# Patient Record
Sex: MALE | Race: OTHER | URBAN - METROPOLITAN AREA
[De-identification: names, ages, dates, MRNs, and addresses within clinical notes are randomized per-mention and may not be internally consistent; named-entity substitution may affect disease eponyms.]

---

## 2018-03-09 ENCOUNTER — OFFICE VISIT (OUTPATIENT)
Dept: INTERNAL MEDICINE CLINIC | Facility: CLINIC | Age: 23
End: 2018-03-09

## 2018-03-09 VITALS
WEIGHT: 237 LBS | RESPIRATION RATE: 18 BRPM | HEIGHT: 74 IN | DIASTOLIC BLOOD PRESSURE: 82 MMHG | OXYGEN SATURATION: 98 % | HEART RATE: 73 BPM | BODY MASS INDEX: 30.42 KG/M2 | SYSTOLIC BLOOD PRESSURE: 128 MMHG

## 2018-03-09 DIAGNOSIS — R55 VASOVAGAL EPISODE: ICD-10-CM

## 2018-03-09 DIAGNOSIS — L21.0 SEBORRHEA CAPITIS IN ADULT: Primary | ICD-10-CM

## 2018-03-09 PROCEDURE — 99213 OFFICE O/P EST LOW 20 MIN: CPT | Performed by: FAMILY MEDICINE

## 2018-03-09 RX ORDER — FLUOCINONIDE 0.5 MG/ML
1 SOLUTION TOPICAL DAILY
Qty: 120 ML | Refills: 5 | Status: SHIPPED | OUTPATIENT
Start: 2018-03-09

## 2018-03-09 RX ORDER — SELENIUM SULFIDE 22.5 MG/ML
1 SHAMPOO TOPICAL EVERY OTHER DAY
Qty: 180 ML | Refills: 5 | Status: SHIPPED | OUTPATIENT
Start: 2018-03-09

## 2018-03-09 NOTE — PROGRESS NOTES
CC:  Derm Problem (Pt presents to clinic for c/o dry skin that started on scalp 6 montha go and it now on forehead and eyebrows.)      Hx of CC:  6 MONTHS WITH GREASY SCALY PLAQUES ON HAIR LINE/SCALP NOT RESOLVED WITH HEAD AND SHOULDERS.     ALSO REPORTS AN

## 2021-12-01 ENCOUNTER — APPOINTMENT (OUTPATIENT)
Dept: URBAN - METROPOLITAN AREA CLINIC 321 | Age: 26
Setting detail: DERMATOLOGY
End: 2021-12-02

## 2021-12-01 DIAGNOSIS — R21 RASH AND OTHER NONSPECIFIC SKIN ERUPTION: ICD-10-CM

## 2021-12-01 PROCEDURE — 11104 PUNCH BX SKIN SINGLE LESION: CPT

## 2021-12-01 PROCEDURE — OTHER PRESCRIPTION: OTHER

## 2021-12-01 PROCEDURE — OTHER BIOPSY BY PUNCH METHOD: OTHER

## 2021-12-01 PROCEDURE — OTHER COUNSELING: OTHER

## 2021-12-01 RX ORDER — FLUOCINONIDE 0.5 MG/ML
SOLUTION TOPICAL
Qty: 60 | Refills: 0 | Status: ERX | COMMUNITY
Start: 2021-12-01

## 2021-12-01 RX ORDER — TRIAMCINOLONE ACETONIDE 1 MG/G
OINTMENT TOPICAL BID
Qty: 454 | Refills: 0 | Status: ERX | COMMUNITY
Start: 2021-12-01

## 2021-12-01 ASSESSMENT — LOCATION SIMPLE DESCRIPTION DERM
LOCATION SIMPLE: LEFT CALF
LOCATION SIMPLE: LEFT PRETIBIAL REGION
LOCATION SIMPLE: RIGHT PRETIBIAL REGION
LOCATION SIMPLE: RIGHT FOREARM
LOCATION SIMPLE: LEFT FOREHEAD
LOCATION SIMPLE: LEFT FOREARM
LOCATION SIMPLE: ABDOMEN
LOCATION SIMPLE: CHEST
LOCATION SIMPLE: RIGHT CHEEK

## 2021-12-01 ASSESSMENT — LOCATION DETAILED DESCRIPTION DERM
LOCATION DETAILED: PERIUMBILICAL SKIN
LOCATION DETAILED: LEFT PROXIMAL CALF
LOCATION DETAILED: LEFT DISTAL DORSAL FOREARM
LOCATION DETAILED: LEFT LATERAL ABDOMEN
LOCATION DETAILED: RIGHT LATERAL SUPERIOR CHEST
LOCATION DETAILED: RIGHT SUPERIOR LATERAL BUCCAL CHEEK
LOCATION DETAILED: LEFT PROXIMAL PRETIBIAL REGION
LOCATION DETAILED: LEFT LATERAL SUPERIOR CHEST
LOCATION DETAILED: RIGHT DISTAL DORSAL FOREARM
LOCATION DETAILED: LEFT MEDIAL FOREHEAD
LOCATION DETAILED: RIGHT PROXIMAL PRETIBIAL REGION
LOCATION DETAILED: LEFT VENTRAL DISTAL FOREARM
LOCATION DETAILED: RIGHT VENTRAL PROXIMAL FOREARM

## 2021-12-01 ASSESSMENT — LOCATION ZONE DERM
LOCATION ZONE: LEG
LOCATION ZONE: FACE
LOCATION ZONE: ARM
LOCATION ZONE: TRUNK

## 2021-12-01 NOTE — PROCEDURE: BIOPSY BY PUNCH METHOD
Consent: Written consent was obtained and risks were reviewed including but not limited to scarring, infection, bleeding, scabbing, incomplete removal, nerve damage and allergy to anesthesia.
Size Of Lesion In Cm (Optional): 0
Suture Removal: 14 days
Biopsy Type: H and E
Validate Note Data (See Information Below): Yes
Validate Triangulation: No
Anesthesia Volume In Cc (Will Not Render If 0): 0.5
Hemostasis: None
Punch Size In Mm: 4
Post-Care Instructions: I reviewed with the patient in detail post-care instructions. Patient is to keep the biopsy site dry overnight, and then apply vaseline twice daily until healed.
Detail Level: Detailed
Epidermal Sutures: 4-0 Nylon
Billing Type: Third-Party Bill
Anesthesia Type: 1% lidocaine with epinephrine
Dressing: bandage
Wound Care: Petrolatum
Information: Selecting Yes will display possible errors in your note based on the variables you have selected. This validation is only offered as a suggestion for you. PLEASE NOTE THAT THE VALIDATION TEXT WILL BE REMOVED WHEN YOU FINALIZE YOUR NOTE. IF YOU WANT TO FAX A PRELIMINARY NOTE YOU WILL NEED TO TOGGLE THIS TO 'NO' IF YOU DO NOT WANT IT IN YOUR FAXED NOTE.
Notification Instructions: Patient will be notified of biopsy results. However, patient instructed to call the office if not contacted within 2 weeks.

## 2021-12-15 ENCOUNTER — APPOINTMENT (OUTPATIENT)
Dept: URBAN - METROPOLITAN AREA CLINIC 321 | Age: 26
Setting detail: DERMATOLOGY
End: 2021-12-16

## 2021-12-15 DIAGNOSIS — L40.0 PSORIASIS VULGARIS: ICD-10-CM

## 2021-12-15 PROCEDURE — 99214 OFFICE O/P EST MOD 30 MIN: CPT

## 2021-12-15 PROCEDURE — OTHER COUNSELING: OTHER

## 2021-12-15 PROCEDURE — OTHER PRESCRIPTION MEDICATION MANAGEMENT: OTHER

## 2021-12-15 ASSESSMENT — LOCATION DETAILED DESCRIPTION DERM
LOCATION DETAILED: STERNUM
LOCATION DETAILED: RIGHT INFERIOR MEDIAL UPPER BACK
LOCATION DETAILED: PERIUMBILICAL SKIN
LOCATION DETAILED: LEFT ANTERIOR DISTAL UPPER ARM
LOCATION DETAILED: RIGHT ANTERIOR DISTAL UPPER ARM

## 2021-12-15 ASSESSMENT — LOCATION ZONE DERM
LOCATION ZONE: ARM
LOCATION ZONE: TRUNK

## 2021-12-15 ASSESSMENT — LOCATION SIMPLE DESCRIPTION DERM
LOCATION SIMPLE: RIGHT UPPER BACK
LOCATION SIMPLE: RIGHT UPPER ARM
LOCATION SIMPLE: ABDOMEN
LOCATION SIMPLE: LEFT UPPER ARM
LOCATION SIMPLE: CHEST

## 2021-12-15 ASSESSMENT — BSA PSORIASIS: % BODY COVERED IN PSORIASIS: 20

## 2021-12-15 NOTE — PROCEDURE: PRESCRIPTION MEDICATION MANAGEMENT
Detail Level: Zone
Render In Strict Bullet Format?: No
Continue Regimen: Triamcinolone topical and fluocinonide solution

## 2021-12-15 NOTE — PROCEDURE: COUNSELING
Patient Specific Counseling (Will Not Stick From Patient To Patient): \\nDiscussed in detail various treatment options including topicals vs Otezla vs biologics. Pt opts to start with topicals. Pt given handouts on Otezla and Illumya
Detail Level: Generalized

## 2022-01-24 ENCOUNTER — APPOINTMENT (OUTPATIENT)
Dept: URBAN - METROPOLITAN AREA CLINIC 321 | Age: 27
Setting detail: DERMATOLOGY
End: 2022-01-24

## 2022-01-24 DIAGNOSIS — L40.0 PSORIASIS VULGARIS: ICD-10-CM

## 2022-01-24 DIAGNOSIS — Z79.899 OTHER LONG TERM (CURRENT) DRUG THERAPY: ICD-10-CM

## 2022-01-24 PROCEDURE — OTHER ORDER TESTS: OTHER

## 2022-01-24 PROCEDURE — OTHER COUNSELING: OTHER

## 2022-01-24 PROCEDURE — OTHER PRESCRIPTION MEDICATION MANAGEMENT: OTHER

## 2022-01-24 PROCEDURE — 99214 OFFICE O/P EST MOD 30 MIN: CPT

## 2022-01-24 ASSESSMENT — LOCATION DETAILED DESCRIPTION DERM
LOCATION DETAILED: RIGHT ANTERIOR DISTAL UPPER ARM
LOCATION DETAILED: PERIUMBILICAL SKIN
LOCATION DETAILED: LEFT ANTERIOR DISTAL UPPER ARM
LOCATION DETAILED: RIGHT INFERIOR MEDIAL UPPER BACK
LOCATION DETAILED: STERNUM

## 2022-01-24 ASSESSMENT — LOCATION SIMPLE DESCRIPTION DERM
LOCATION SIMPLE: RIGHT UPPER BACK
LOCATION SIMPLE: LEFT UPPER ARM
LOCATION SIMPLE: ABDOMEN
LOCATION SIMPLE: RIGHT UPPER ARM
LOCATION SIMPLE: CHEST

## 2022-01-24 ASSESSMENT — LOCATION ZONE DERM
LOCATION ZONE: TRUNK
LOCATION ZONE: ARM

## 2022-01-24 NOTE — PROCEDURE: PRESCRIPTION MEDICATION MANAGEMENT
Render In Strict Bullet Format?: No
Continue Regimen: Triamcinolone topical and fluocinonide solution
Detail Level: Zone

## 2022-03-24 ENCOUNTER — APPOINTMENT (OUTPATIENT)
Dept: URBAN - METROPOLITAN AREA CLINIC 244 | Age: 27
Setting detail: DERMATOLOGY
End: 2022-03-26

## 2022-03-24 ENCOUNTER — RX ONLY (RX ONLY)
Age: 27
End: 2022-03-24

## 2022-03-24 DIAGNOSIS — Z79.899 OTHER LONG TERM (CURRENT) DRUG THERAPY: ICD-10-CM

## 2022-03-24 DIAGNOSIS — L40.0 PSORIASIS VULGARIS: ICD-10-CM

## 2022-03-24 PROCEDURE — OTHER COUNSELING: OTHER

## 2022-03-24 PROCEDURE — OTHER ORDER TESTS: OTHER

## 2022-03-24 PROCEDURE — 99214 OFFICE O/P EST MOD 30 MIN: CPT

## 2022-03-24 PROCEDURE — OTHER ADDITIONAL NOTES: OTHER

## 2022-03-24 PROCEDURE — OTHER PRESCRIPTION MEDICATION MANAGEMENT: OTHER

## 2022-03-24 RX ORDER — TRIAMCINOLONE ACETONIDE 1 MG/G
OINTMENT TOPICAL BID
Qty: 454 | Refills: 2 | Status: ERX

## 2022-03-24 ASSESSMENT — LOCATION DETAILED DESCRIPTION DERM
LOCATION DETAILED: LEFT ANTERIOR DISTAL UPPER ARM
LOCATION DETAILED: STERNUM
LOCATION DETAILED: PERIUMBILICAL SKIN
LOCATION DETAILED: RIGHT INFERIOR MEDIAL UPPER BACK
LOCATION DETAILED: RIGHT ANTERIOR DISTAL UPPER ARM

## 2022-03-24 ASSESSMENT — LOCATION ZONE DERM
LOCATION ZONE: ARM
LOCATION ZONE: TRUNK

## 2022-03-24 ASSESSMENT — LOCATION SIMPLE DESCRIPTION DERM
LOCATION SIMPLE: CHEST
LOCATION SIMPLE: LEFT UPPER ARM
LOCATION SIMPLE: RIGHT UPPER BACK
LOCATION SIMPLE: ABDOMEN
LOCATION SIMPLE: RIGHT UPPER ARM

## 2022-03-24 NOTE — PROCEDURE: ADDITIONAL NOTES
Render Risk Assessment In Note?: no
Detail Level: Simple
Additional Notes: Lab order was provided PA will initiate when pt. returns with results.

## 2022-03-24 NOTE — PROCEDURE: PRESCRIPTION MEDICATION MANAGEMENT
Continue Regimen: Triamcinolone topical Ointment- Body PRN
Plan: Will RTC to initiate the NBUVB. Pre D form was provided to pt, he will verify w/ins if NBUVB is covered
Detail Level: Zone
Render In Strict Bullet Format?: No
Discontinue Regimen: fluocinonide solution

## 2023-01-10 ENCOUNTER — OFFICE VISIT (OUTPATIENT)
Dept: FAMILY MEDICINE CLINIC | Facility: CLINIC | Age: 28
End: 2023-01-10
Payer: COMMERCIAL

## 2023-01-10 VITALS
SYSTOLIC BLOOD PRESSURE: 138 MMHG | HEIGHT: 74 IN | BODY MASS INDEX: 38.35 KG/M2 | WEIGHT: 298.81 LBS | RESPIRATION RATE: 18 BRPM | TEMPERATURE: 97 F | OXYGEN SATURATION: 97 % | DIASTOLIC BLOOD PRESSURE: 74 MMHG | HEART RATE: 82 BPM

## 2023-01-10 DIAGNOSIS — J34.89 NASAL DRYNESS: Primary | ICD-10-CM

## 2023-01-10 DIAGNOSIS — R68.2 DRY MOUTH: ICD-10-CM

## 2023-01-10 DIAGNOSIS — R09.81 NASAL CONGESTION: ICD-10-CM

## 2023-01-10 PROCEDURE — 3078F DIAST BP <80 MM HG: CPT | Performed by: NURSE PRACTITIONER

## 2023-01-10 PROCEDURE — 3008F BODY MASS INDEX DOCD: CPT | Performed by: NURSE PRACTITIONER

## 2023-01-10 PROCEDURE — 99202 OFFICE O/P NEW SF 15 MIN: CPT | Performed by: NURSE PRACTITIONER

## 2023-01-10 PROCEDURE — 3075F SYST BP GE 130 - 139MM HG: CPT | Performed by: NURSE PRACTITIONER

## 2023-01-10 NOTE — PATIENT INSTRUCTIONS
- For dryness, you should push fluids, try over the counter nasal saline spray as needed, can apply vaseline or aquaphor ointment right within base of your nostril, use a humidifier or sit near steam/steam showers frequently. - Follow up if persistent congestion/sinus symptoms at the point of 10 days. - Call sooner if new symptoms.  - Avoid alcohol or drug use. - Go to the ER if any symptoms of dehydration such as decreased urination, not able to take fluids, dizziness.

## 2023-02-28 ENCOUNTER — APPOINTMENT (OUTPATIENT)
Dept: URBAN - METROPOLITAN AREA CLINIC 244 | Age: 28
Setting detail: DERMATOLOGY
End: 2023-03-02

## 2023-02-28 DIAGNOSIS — Z79.899 OTHER LONG TERM (CURRENT) DRUG THERAPY: ICD-10-CM

## 2023-02-28 DIAGNOSIS — L40.0 PSORIASIS VULGARIS: ICD-10-CM

## 2023-02-28 PROCEDURE — OTHER PRESCRIPTION: OTHER

## 2023-02-28 PROCEDURE — OTHER MEDICATION COUNSELING: OTHER

## 2023-02-28 PROCEDURE — 99214 OFFICE O/P EST MOD 30 MIN: CPT

## 2023-02-28 PROCEDURE — OTHER ORDER TESTS: OTHER

## 2023-02-28 PROCEDURE — OTHER COUNSELING: OTHER

## 2023-02-28 RX ORDER — TACROLIMUS 1 MG/G
OINTMENT TOPICAL
Qty: 60 | Refills: 3 | Status: ACTIVE

## 2023-02-28 RX ORDER — TRIAMCINOLONE ACETONIDE 1 MG/G
OINTMENT TOPICAL BID
Qty: 454 | Refills: 0 | Status: ERX

## 2023-02-28 ASSESSMENT — LOCATION SIMPLE DESCRIPTION DERM
LOCATION SIMPLE: RIGHT FOREARM
LOCATION SIMPLE: RIGHT PRETIBIAL REGION
LOCATION SIMPLE: LEFT FOREARM
LOCATION SIMPLE: RIGHT CHEEK
LOCATION SIMPLE: CHEST
LOCATION SIMPLE: LEFT PRETIBIAL REGION
LOCATION SIMPLE: LEFT CALF
LOCATION SIMPLE: LEFT FOREHEAD
LOCATION SIMPLE: ABDOMEN

## 2023-02-28 ASSESSMENT — LOCATION ZONE DERM
LOCATION ZONE: FACE
LOCATION ZONE: LEG
LOCATION ZONE: TRUNK
LOCATION ZONE: ARM

## 2023-02-28 ASSESSMENT — LOCATION DETAILED DESCRIPTION DERM
LOCATION DETAILED: LEFT PROXIMAL PRETIBIAL REGION
LOCATION DETAILED: RIGHT SUPERIOR LATERAL BUCCAL CHEEK
LOCATION DETAILED: LEFT VENTRAL DISTAL FOREARM
LOCATION DETAILED: PERIUMBILICAL SKIN
LOCATION DETAILED: LEFT MEDIAL FOREHEAD
LOCATION DETAILED: RIGHT LATERAL SUPERIOR CHEST
LOCATION DETAILED: LEFT PROXIMAL CALF
LOCATION DETAILED: LEFT LATERAL SUPERIOR CHEST
LOCATION DETAILED: LEFT DISTAL DORSAL FOREARM
LOCATION DETAILED: RIGHT PROXIMAL PRETIBIAL REGION
LOCATION DETAILED: RIGHT DISTAL DORSAL FOREARM
LOCATION DETAILED: RIGHT VENTRAL PROXIMAL FOREARM

## 2023-02-28 NOTE — PROCEDURE: MEDICATION COUNSELING
Consent: The patient's consent was obtained including but not limited to risks of crusting, scabbing, blistering, scarring, darker or lighter pigmentary change, recurrence, incomplete removal and infection. Detail Level: Detailed Include Z78.9 (Other Specified Conditions Influencing Health Status) As An Associated Diagnosis?: No Treatment Number (Will Not Render If 0): 0 Medical Necessity Information: It is in your best interest to select a reason for this procedure from the list below. All of these items fulfill various CMS LCD requirements except the new and changing color options. Post-Care Instructions: I reviewed with the patient in detail post-care instructions. Patient is to wear sunprotection, and avoid picking at any of the treated lesions. Pt may apply Vaseline to crusted or scabbing areas. Medical Necessity Clause: This procedure was medically necessary because the lesions that were treated were: spreading Anesthesia Volume In Cc: 2 Benzoyl Peroxide Pregnancy And Lactation Text: This medication is Pregnancy Category C. It is unknown if benzoyl peroxide is excreted in breast milk.

## 2023-02-28 NOTE — PROCEDURE: MEDICATION COUNSELING
Xelrajsehz Pregnancy And Lactation Text: This medication is Pregnancy Category D and is not considered safe during pregnancy.  The risk during breast feeding is also uncertain. Xelrajeshz Pregnancy And Lactation Text: This medication is Pregnancy Category D and is not considered safe during pregnancy.  The risk during breast feeding is also uncertain.

## 2023-07-13 NOTE — PROCEDURE: MEDICATION COUNSELING
show Vtama Pregnancy And Lactation Text: It is unknown if this medication can cause problems during pregnancy and breastfeeding.

## 2024-02-03 ENCOUNTER — OFFICE VISIT (OUTPATIENT)
Dept: FAMILY MEDICINE CLINIC | Facility: CLINIC | Age: 29
End: 2024-02-03
Payer: COMMERCIAL

## 2024-02-03 VITALS
DIASTOLIC BLOOD PRESSURE: 84 MMHG | HEART RATE: 78 BPM | OXYGEN SATURATION: 97 % | WEIGHT: 275 LBS | RESPIRATION RATE: 20 BRPM | TEMPERATURE: 98 F | HEIGHT: 74 IN | BODY MASS INDEX: 35.29 KG/M2 | SYSTOLIC BLOOD PRESSURE: 136 MMHG

## 2024-02-03 DIAGNOSIS — R06.2 WHEEZING: ICD-10-CM

## 2024-02-03 DIAGNOSIS — J22 LOWER RESPIRATORY TRACT INFECTION: Primary | ICD-10-CM

## 2024-02-03 PROCEDURE — 99213 OFFICE O/P EST LOW 20 MIN: CPT

## 2024-02-03 PROCEDURE — 3075F SYST BP GE 130 - 139MM HG: CPT

## 2024-02-03 PROCEDURE — 3079F DIAST BP 80-89 MM HG: CPT

## 2024-02-03 PROCEDURE — 3008F BODY MASS INDEX DOCD: CPT

## 2024-02-03 RX ORDER — AMOXICILLIN AND CLAVULANATE POTASSIUM 875; 125 MG/1; MG/1
1 TABLET, FILM COATED ORAL 2 TIMES DAILY
Qty: 14 TABLET | Refills: 0 | Status: SHIPPED | OUTPATIENT
Start: 2024-02-03 | End: 2024-02-10

## 2024-02-03 RX ORDER — ALBUTEROL SULFATE 90 UG/1
2 AEROSOL, METERED RESPIRATORY (INHALATION) EVERY 4 HOURS PRN
Qty: 1 EACH | Refills: 0 | Status: SHIPPED | OUTPATIENT
Start: 2024-02-03

## 2024-02-03 NOTE — PROGRESS NOTES
CHIEF COMPLAINT:     Chief Complaint   Patient presents with    Ear Problem       HPI:   Manny Vernon is a 28 year old male who presents for upper respiratory symptoms for  2 weeks. Symptoms have been mild since onset. Patient was seen on 1/20/2024 for an otitis media and treated with 10 days of Amoxicillin 875 mg. Patient reports continued cough with raspy voice and ear pressure despite treatment.     Current Outpatient Medications   Medication Sig Dispense Refill    albuterol (PROAIR HFA) 108 (90 Base) MCG/ACT Inhalation Aero Soln Inhale 2 puffs into the lungs every 4 (four) hours as needed for Wheezing. 1 each 0    amoxicillin clavulanate 875-125 MG Oral Tab Take 1 tablet by mouth 2 (two) times daily for 7 days. 14 tablet 0    Fluocinonide 0.05 % External Solution Apply 1 Application topically daily. 120 mL 5    Selenium Sulf-Pyrithione-Urea (SELENIUM SULFIDE) 2.25 % External Shampoo Apply 1 Application topically every other day. (Patient not taking: Reported on 2/3/2024) 180 mL 5      History reviewed. No pertinent past medical history.   Past Surgical History:   Procedure Laterality Date    OTHER SURGICAL HISTORY      testicle surgery-          Social History     Socioeconomic History    Marital status: Single   Tobacco Use    Smoking status: Never    Smokeless tobacco: Never   Substance and Sexual Activity    Alcohol use: No     Alcohol/week: 0.0 standard drinks of alcohol    Drug use: Yes     Types: Cannabis     Comment: occ use         REVIEW OF SYSTEMS:   GENERAL: Normal appetite  SKIN: no rashes or abnormal skin lesions  HEENT: See HPI  LUNGS: See HPI  CARDIOVASCULAR: denies chest pain or palpitations   GI: denies N/V/C or abdominal pain      EXAM:   /84   Pulse 78   Temp 97.6 °F (36.4 °C)   Resp 20   Ht 6' 2\" (1.88 m)   Wt 275 lb (124.7 kg)   SpO2 97%   BMI 35.31 kg/m²   Physical Exam  Vitals reviewed.   Constitutional:       General: He is not in acute distress.     Appearance:  Normal appearance. He is not ill-appearing or toxic-appearing.   HENT:      Head: Normocephalic and atraumatic.      Right Ear: Ear canal and external ear normal. A middle ear effusion is present.      Left Ear: Ear canal and external ear normal. A middle ear effusion is present.      Nose: Nose normal.      Mouth/Throat:      Mouth: Mucous membranes are moist.      Pharynx: Oropharynx is clear. Uvula midline. Posterior oropharyngeal erythema present. No oropharyngeal exudate or uvula swelling.      Tonsils: No tonsillar exudate or tonsillar abscesses. 1+ on the right. 1+ on the left.   Eyes:      Extraocular Movements: Extraocular movements intact.      Conjunctiva/sclera: Conjunctivae normal.      Pupils: Pupils are equal, round, and reactive to light.   Cardiovascular:      Rate and Rhythm: Normal rate and regular rhythm.      Pulses: Normal pulses.      Heart sounds: Normal heart sounds.   Pulmonary:      Effort: Pulmonary effort is normal. No respiratory distress.      Breath sounds: No stridor. Wheezing and rhonchi present.      Comments: +cough    Musculoskeletal:         General: Normal range of motion.      Cervical back: Normal range of motion and neck supple. No rigidity.   Lymphadenopathy:      Cervical: No cervical adenopathy.   Skin:     General: Skin is warm and dry.      Capillary Refill: Capillary refill takes less than 2 seconds.      Findings: No rash.   Neurological:      General: No focal deficit present.      Mental Status: He is alert and oriented to person, place, and time.   Psychiatric:         Mood and Affect: Mood normal.         Behavior: Behavior normal.            ASSESSMENT AND PLAN:   Manny Vernon is a 28 year old male who presents with upper respiratory symptoms that are consistent with    ASSESSMENT:   Encounter Diagnoses   Name Primary?    Wheezing     Lower respiratory tract infection Yes       PLAN: Education provided.  Questions answered.  Reassurance given. Discussed  with patient that the physical exam and symptoms are most likey due to lower respiratory infection and will treat with antibiotics. Will prescribe inhaler for wheezing. Medications as listed below. Risks, benefits, side effects of medication addressed and explained. Advised patient to continue supportive care: maintain hydration and symptom management with OTC medications. Comfort Care as listed in Patient Instructions. Recommend close follow up with PCP. If sx's persist or worsen patient is advised to go to ER for immediate evaluation and treatment. The patient indicates understanding of these issues and agrees to the plan.    Meds & Refills for this Visit:  Requested Prescriptions     Signed Prescriptions Disp Refills    albuterol (PROAIR HFA) 108 (90 Base) MCG/ACT Inhalation Aero Soln 1 each 0     Sig: Inhale 2 puffs into the lungs every 4 (four) hours as needed for Wheezing.    amoxicillin clavulanate 875-125 MG Oral Tab 14 tablet 0     Sig: Take 1 tablet by mouth 2 (two) times daily for 7 days.

## 2024-02-16 ENCOUNTER — OFFICE VISIT (OUTPATIENT)
Dept: FAMILY MEDICINE CLINIC | Facility: CLINIC | Age: 29
End: 2024-02-16
Payer: COMMERCIAL

## 2024-02-16 VITALS
TEMPERATURE: 99 F | WEIGHT: 279 LBS | BODY MASS INDEX: 35.81 KG/M2 | OXYGEN SATURATION: 97 % | HEART RATE: 70 BPM | HEIGHT: 74 IN | DIASTOLIC BLOOD PRESSURE: 74 MMHG | RESPIRATION RATE: 16 BRPM | SYSTOLIC BLOOD PRESSURE: 138 MMHG

## 2024-02-16 DIAGNOSIS — R05.2 SUBACUTE COUGH: Primary | ICD-10-CM

## 2024-02-16 DIAGNOSIS — J34.89 SINUS DRAINAGE: ICD-10-CM

## 2024-02-16 PROCEDURE — 3075F SYST BP GE 130 - 139MM HG: CPT | Performed by: NURSE PRACTITIONER

## 2024-02-16 PROCEDURE — 3008F BODY MASS INDEX DOCD: CPT | Performed by: NURSE PRACTITIONER

## 2024-02-16 PROCEDURE — 99213 OFFICE O/P EST LOW 20 MIN: CPT | Performed by: NURSE PRACTITIONER

## 2024-02-16 PROCEDURE — 3078F DIAST BP <80 MM HG: CPT | Performed by: NURSE PRACTITIONER

## 2024-02-16 NOTE — PROGRESS NOTES
Subjective:   Patient ID: Manny Vernon is a 29 year old male.    Pt presents to [facility] c/o sinus/cough symptoms for 3 weeks.  C/o productive cough, nasal congestion, intermittent sore throat, intermittent left ear pain.  Feels like its sometimes hard to breathe in due to congestion and mucous in throat but otherwise no breathing difficulty.  Denies fever, body aches, chills, wheezing, chest tightness, dyspnea, SOB, chest pain, GI complaints, or rashes.  Was seen in [facility] 2/3/24 and given course of augmentin for lower respiratory infection, just prior to that he took course of amox for ear/sinus symptoms.  He feels the Augmentin improved symptoms but some of it's persisting.  Took one dayquil today.  Smokes cannabis regularly.  No known ill contacts or travel.        History/Other:   Review of Systems   Constitutional:  Negative for fatigue and fever.   HENT:  Positive for congestion, ear pain, postnasal drip, sinus pressure and sore throat. Negative for sinus pain.    Respiratory:  Positive for cough. Negative for chest tightness, shortness of breath and wheezing.    Cardiovascular:  Negative for chest pain.   Musculoskeletal:  Negative for myalgias.     Current Outpatient Medications   Medication Sig Dispense Refill    albuterol (PROAIR HFA) 108 (90 Base) MCG/ACT Inhalation Aero Soln Inhale 2 puffs into the lungs every 4 (four) hours as needed for Wheezing. 1 each 0    Fluocinonide 0.05 % External Solution Apply 1 Application topically daily. 120 mL 5     Allergies:No Known Allergies    Objective:   /74   Pulse 70   Temp 98.7 °F (37.1 °C)   Resp 16   Ht 6' 2\" (1.88 m)   Wt 279 lb (126.6 kg)   SpO2 97%   BMI 35.82 kg/m²     Physical Exam  Vitals reviewed.   Constitutional:       Appearance: Normal appearance.   HENT:      Head: Normocephalic and atraumatic.      Right Ear: Tympanic membrane, ear canal and external ear normal.      Left Ear: Tympanic membrane, ear canal and external ear normal.       Nose: Congestion present. No rhinorrhea.      Mouth/Throat:      Mouth: Mucous membranes are moist.      Pharynx: Posterior oropharyngeal erythema (with post nasal drainage) present. No oropharyngeal exudate.   Eyes:      Extraocular Movements: Extraocular movements intact.      Conjunctiva/sclera: Conjunctivae normal.      Pupils: Pupils are equal, round, and reactive to light.   Cardiovascular:      Rate and Rhythm: Normal rate and regular rhythm.      Pulses: Normal pulses.      Heart sounds: Normal heart sounds.   Pulmonary:      Effort: Pulmonary effort is normal. No respiratory distress.      Breath sounds: Normal breath sounds. No wheezing or rhonchi.      Comments: +productive cough- occasional  Musculoskeletal:      Cervical back: Normal range of motion and neck supple. No tenderness.   Skin:     General: Skin is warm and dry.      Findings: No rash.   Neurological:      Mental Status: He is alert.         Assessment & Plan:   1. Subacute cough    2. Sinus drainage        No orders of the defined types were placed in this encounter.      Meds This Visit:  Requested Prescriptions      No prescriptions requested or ordered in this encounter       - Just completed amox then augmentin.  No strong sign of bacterial infection at this time.  - Lungs clear, vitals/o2 sat stable, no dyspnea.  - Suggested Flonase, Mucinex.  - Humidifier, fluids, elevate HOB at night.    - Encourage smoking cessation.  - Follow up if no improvement/worsening within 1-2 weeks; sooner if new fever or worsening breathing.  - To ER if ever dyspnea, chest pain, SOB.  - Pt verbalizes understanding.

## 2024-02-26 ENCOUNTER — HOSPITAL ENCOUNTER (OUTPATIENT)
Age: 29
Discharge: HOME | End: 2024-02-26
Payer: COMMERCIAL

## 2024-02-26 VITALS
OXYGEN SATURATION: 98 % | TEMPERATURE: 98 F | DIASTOLIC BLOOD PRESSURE: 83 MMHG | SYSTOLIC BLOOD PRESSURE: 113 MMHG | RESPIRATION RATE: 16 BRPM | HEART RATE: 79 BPM

## 2024-02-26 DIAGNOSIS — J06.9 VIRAL UPPER RESPIRATORY TRACT INFECTION: Primary | ICD-10-CM

## 2024-02-26 LAB — S PYO AG THROAT QL: NEGATIVE

## 2024-02-26 PROCEDURE — 87880 STREP A ASSAY W/OPTIC: CPT

## 2024-02-26 PROCEDURE — 99213 OFFICE O/P EST LOW 20 MIN: CPT

## 2024-02-26 RX ORDER — BENZONATATE 100 MG/1
100 CAPSULE ORAL 3 TIMES DAILY PRN
Qty: 30 CAPSULE | Refills: 0 | Status: SHIPPED | OUTPATIENT
Start: 2024-02-26 | End: 2024-03-27

## 2024-02-26 NOTE — ED INITIAL ASSESSMENT (HPI)
Pt states with sore throat, coughing up phlegm, L ear popping x 1 month. Pt tx for ear infection, given amox ~ 1 month ago.  Seen again in a clinic and given amox-clauv which he finished over two weeks ago. Pt still with symptoms. Denies fever and chills

## 2024-02-26 NOTE — DISCHARGE INSTRUCTIONS
Strep test is negative.  There are no signs of infection on physical exam.  This is likely a viral illness.  I sent a prescription for Tessalon Perles for the cough.  Please be sure to drink plenty of fluids, use Tylenol and Motrin for pain or fever.  Use Flonase and Mucinex for congestion.  If you develop any respiratory complaints, fever that does not improve with medications or any other concerning complaints you should go to the emergency department. Otherwise follow up with your primary care provider.

## 2024-02-26 NOTE — ED PROVIDER NOTES
Patient Seen in: [facility]      History     Chief Complaint   Patient presents with    Cough/URI     Stated Complaint: ear issue    Subjective:   Manny is a 29-year-old male presenting to the immediate care complaining of cough, congestion, rhinorrhea and sore throat for the past month.  Patient states that he was seen at the beginning of the month in the walk-in clinic and put on an antibiotic for an ear infection.  Patient was seen 2 weeks later for continued congestion and ear pain and treated with Augmentin.  Patient states that he has had increased sore throat over the past couple of days so he came in for evaluation.  Patient also states that he has continued postnasal drip and rhinorrhea.  Has not tried any over-the-counter cold medication.  He denies any fever, chest pain, shortness of breath, abdominal pain or vomiting.  He has been eating and drinking well and is well-hydrated.  He denies any other concerns or complaints.          Objective:   No pertinent past medical history.            No pertinent past surgical history.              No pertinent social history.            Review of Systems   HENT:  Positive for congestion, postnasal drip, rhinorrhea and sore throat.    Respiratory:  Positive for cough.    All other systems reviewed and are negative.      Positive for stated complaint: ear issue  Other systems are as noted in HPI.  Constitutional and vital signs reviewed.      All other systems reviewed and negative except as noted above.    Physical Exam     ED Triage Vitals [02/26/24 1019]   /83   Pulse 79   Resp 16   Temp 97.8 °F (36.6 °C)   Temp src Temporal   SpO2 98 %   O2 Device None (Room air)       Current:/83   Pulse 79   Temp 97.8 °F (36.6 °C) (Temporal)   Resp 16   SpO2 98%         Physical Exam  Vitals and nursing note reviewed.   Constitutional:       General: He is not in acute distress.     Appearance: Normal appearance. He is not ill-appearing,  toxic-appearing or diaphoretic.   HENT:      Head: Normocephalic.      Right Ear: Tympanic membrane, ear canal and external ear normal.      Left Ear: Tympanic membrane, ear canal and external ear normal.      Nose: Congestion and rhinorrhea present.      Mouth/Throat:      Mouth: Mucous membranes are moist.      Pharynx: Oropharynx is clear.   Eyes:      Conjunctiva/sclera: Conjunctivae normal.   Cardiovascular:      Rate and Rhythm: Normal rate and regular rhythm.      Pulses: Normal pulses.      Heart sounds: Normal heart sounds.   Pulmonary:      Effort: Pulmonary effort is normal. No respiratory distress.      Breath sounds: Normal breath sounds. No stridor. No wheezing, rhonchi or rales.   Abdominal:      General: Abdomen is flat.   Musculoskeletal:         General: Normal range of motion.      Cervical back: Normal range of motion.   Skin:     General: Skin is warm.      Capillary Refill: Capillary refill takes less than 2 seconds.   Neurological:      General: No focal deficit present.      Mental Status: He is alert and oriented to person, place, and time.   Psychiatric:         Mood and Affect: Mood normal.         Behavior: Behavior normal.         Thought Content: Thought content normal.         Judgment: Judgment normal.              ED Course     Labs Reviewed   POCT RAPID STREP - Normal         MDM             Medical Decision Making  Multiple medical diagnoses were considered including but not limited to viral versus bacterial etiology of upper respiratory complaints.  Patient is well appearing, non-toxic and in no acute distress.  Vital signs are stable.   Strep test is negative.  There are no signs of infection on physical exam.  History and physical exam are consistent with viral illness.  Sent a prescription for Tessalon Perles for the cough.  Recommended that patient drink plenty of fluids, use Tylenol and Motrin for pain or fever, use Flonase for nasal congestion and may use over-the-counter  medications for congestion as needed.  Recommended that if the patient develops any chest pain, respiratory complaints, fever that does not improve with medications or any other concerning complaints they should go to the emergency department.    ED precautions discussed.  Patient advised to follow up with PCP in 2-3 days.  Patient agrees with this plan of care.  Patient verbalizes understanding of discharge instructions and plan of care.      Amount and/or Complexity of Data Reviewed  Labs: ordered. Decision-making details documented in ED Course.    Risk  OTC drugs.  Prescription drug management.        Disposition and Plan     Clinical Impression:  1. Viral upper respiratory tract infection         Disposition:  Discharge  2/26/2024 11:35 am    Follow-up:  Hunter Reddy,                 Medications Prescribed:  Discharge Medication List as of 2/26/2024 11:38 AM        START taking these medications    Details   benzonatate 100 MG Oral Cap Take 1 capsule (100 mg total) by mouth 3 (three) times daily as needed for cough., Print, Disp-30 capsule, R-0

## 2024-02-28 ENCOUNTER — APPOINTMENT (OUTPATIENT)
Dept: GENERAL RADIOLOGY | Age: 29
End: 2024-02-28
Attending: NURSE PRACTITIONER
Payer: COMMERCIAL

## 2024-02-28 ENCOUNTER — HOSPITAL ENCOUNTER (OUTPATIENT)
Age: 29
Discharge: HOME | End: 2024-02-28
Payer: COMMERCIAL

## 2024-02-28 VITALS
RESPIRATION RATE: 18 BRPM | HEART RATE: 86 BPM | TEMPERATURE: 97 F | DIASTOLIC BLOOD PRESSURE: 90 MMHG | OXYGEN SATURATION: 96 % | SYSTOLIC BLOOD PRESSURE: 135 MMHG

## 2024-02-28 DIAGNOSIS — K52.1 ANTIBIOTIC-ASSOCIATED DIARRHEA: ICD-10-CM

## 2024-02-28 DIAGNOSIS — T36.95XA ANTIBIOTIC-ASSOCIATED DIARRHEA: ICD-10-CM

## 2024-02-28 DIAGNOSIS — R05.2 SUBACUTE COUGH: Primary | ICD-10-CM

## 2024-02-28 LAB
#MXD IC: 1.1 X10ˆ3/UL (ref 0.1–1)
BUN BLD-MCNC: 11 MG/DL (ref 7–18)
CHLORIDE BLD-SCNC: 101 MMOL/L (ref 98–112)
CO2 BLD-SCNC: 25 MMOL/L (ref 21–32)
CREAT BLD-MCNC: 0.8 MG/DL
EGFRCR SERPLBLD CKD-EPI 2021: 124 ML/MIN/1.73M2 (ref 60–?)
GLUCOSE BLD-MCNC: 95 MG/DL (ref 70–99)
HCT VFR BLD AUTO: 50.1 %
HCT VFR BLD CALC: 57 %
HGB BLD-MCNC: 17.3 G/DL
ISTAT IONIZED CALCIUM FOR CHEM 8: 1.14 MMOL/L (ref 1.12–1.32)
LYMPHOCYTES # BLD AUTO: 2.4 X10ˆ3/UL (ref 1–4)
LYMPHOCYTES NFR BLD AUTO: 19.6 %
MCH RBC QN AUTO: 29.9 PG (ref 26–34)
MCHC RBC AUTO-ENTMCNC: 34.5 G/DL (ref 31–37)
MCV RBC AUTO: 86.7 FL (ref 80–100)
MIXED CELL %: 9.1 %
NEUTROPHILS # BLD AUTO: 8.6 X10ˆ3/UL (ref 1.5–7.7)
NEUTROPHILS NFR BLD AUTO: 71.3 %
PLATELET # BLD AUTO: 257 X10ˆ3/UL (ref 150–450)
POCT MONO: NEGATIVE
POTASSIUM BLD-SCNC: 3.7 MMOL/L (ref 3.6–5.1)
RBC # BLD AUTO: 5.78 X10ˆ6/UL
SODIUM BLD-SCNC: 139 MMOL/L (ref 136–145)
WBC # BLD AUTO: 12.1 X10ˆ3/UL (ref 4–11)

## 2024-02-28 PROCEDURE — 99214 OFFICE O/P EST MOD 30 MIN: CPT | Performed by: NURSE PRACTITIONER

## 2024-02-28 PROCEDURE — 80047 BASIC METABLC PNL IONIZED CA: CPT | Performed by: NURSE PRACTITIONER

## 2024-02-28 PROCEDURE — 71046 X-RAY EXAM CHEST 2 VIEWS: CPT | Performed by: NURSE PRACTITIONER

## 2024-02-28 PROCEDURE — 85025 COMPLETE CBC W/AUTO DIFF WBC: CPT | Performed by: NURSE PRACTITIONER

## 2024-02-28 PROCEDURE — 87493 C DIFF AMPLIFIED PROBE: CPT | Performed by: NURSE PRACTITIONER

## 2024-02-28 PROCEDURE — 86308 HETEROPHILE ANTIBODY SCREEN: CPT | Performed by: NURSE PRACTITIONER

## 2024-02-28 RX ORDER — METHYLPREDNISOLONE 4 MG/1
TABLET ORAL
Qty: 1 EACH | Refills: 0 | Status: SHIPPED | OUTPATIENT
Start: 2024-02-28

## 2024-02-28 NOTE — ED INITIAL ASSESSMENT (HPI)
Pt with sore throat and coughing up phlegm for over a month even after taking antibiotics. Pt also reports nausea and diarrhea since Tuesday.

## 2024-02-28 NOTE — DISCHARGE INSTRUCTIONS
Workup today looks good.  Stool culture is pending.  Results will be available in 1 to 2 days.  We will call you with any positive results, they will also pop up in your MyChart.  You may try a short course of steroids.  Use Flonase over-the-counter.  Imodium over-the-counter for diarrhea.  Stay hydrated.  Limit marijuana use.  Establish care with a primary doctor.

## 2024-02-28 NOTE — ED PROVIDER NOTES
Patient Seen in: [facility]      History     Chief Complaint   Patient presents with    Cough    Diarrhea     Stated Complaint: Abdominal Pain, Cough    Subjective:   29-year-old male with psoriasis presents from home with complaint of sore throat, stomach upset, nausea, diarrhea.  Patient notes that he was seen in another clinic for an ear infection over a month ago.  He was treated with amoxicillin at that time.  He was having persistent symptoms and sore throat so he went to another clinic.  It was noted that he had wheezing at the time and he was treated with Augmentin for lower respiratory tract infection.  States he still had sore throat with congestion and cough so was seen here Monday.  He had a negative strep test and was told to take Mucinex.  States yesterday and today he has had upset stomach with nausea and diarrhea.  Yesterday he had \"a lot\" of diarrhea.  Today with 2 episodes.  Nonbloody.  States he still has sore throat.  No COVID testing has been done.  States still with some intermittent wheezing.  No shortness of breath.  No fever.  He is a marijuana user.    The history is provided by the patient. No  was used.           Objective:   History reviewed. No pertinent past medical history.         HISTORY:  History reviewed. No pertinent past medical history.   Past Surgical History:   Procedure Laterality Date    OTHER SURGICAL HISTORY      testicle surgery-       No family history on file.   Social History     Socioeconomic History    Marital status: Single   Tobacco Use    Smoking status: Never    Smokeless tobacco: Never   Vaping Use    Vaping Use: Never used   Substance and Sexual Activity    Alcohol use: No     Alcohol/week: 0.0 standard drinks of alcohol    Drug use: Yes     Types: Cannabis     Comment: occ use          Past Surgical History:   Procedure Laterality Date    OTHER SURGICAL HISTORY      testicle surgery-                 Social History      Socioeconomic History    Marital status: Single   Tobacco Use    Smoking status: Never    Smokeless tobacco: Never   Vaping Use    Vaping Use: Never used   Substance and Sexual Activity    Alcohol use: No     Alcohol/week: 0.0 standard drinks of alcohol    Drug use: Yes     Types: Cannabis     Comment: occ use              Review of Systems    Positive for stated complaint: Abdominal Pain, Cough  Other systems are as noted in HPI.  Constitutional and vital signs reviewed.      All other systems reviewed and negative except as noted above.    Physical Exam     ED Triage Vitals   BP 02/28/24 1142 135/90   Pulse 02/28/24 1140 86   Resp 02/28/24 1140 18   Temp 02/28/24 1140 97.3 °F (36.3 °C)   Temp src 02/28/24 1140 Temporal   SpO2 02/28/24 1140 96 %   O2 Device 02/28/24 1140 None (Room air)       Current:/90   Pulse 86   Temp 97.3 °F (36.3 °C) (Temporal)   Resp 18   SpO2 96%         Physical Exam  Vitals and nursing note reviewed.   Constitutional:       General: He is not in acute distress.     Appearance: Normal appearance. He is not ill-appearing or toxic-appearing.   HENT:      Head: Normocephalic and atraumatic.      Right Ear: Tympanic membrane, ear canal and external ear normal.      Left Ear: Tympanic membrane, ear canal and external ear normal.      Ears:      Comments: Dry skin/psoriasis to ears/canals     Nose: Nose normal.      Mouth/Throat:      Mouth: Mucous membranes are moist.      Pharynx: Oropharynx is clear. No pharyngeal swelling or posterior oropharyngeal erythema.      Tonsils: No tonsillar exudate.   Eyes:      Pupils: Pupils are equal, round, and reactive to light.   Cardiovascular:      Rate and Rhythm: Normal rate and regular rhythm.      Pulses: Normal pulses.   Pulmonary:      Effort: Pulmonary effort is normal. No respiratory distress.      Breath sounds: Normal breath sounds.      Comments: Lungs clear.  No adventitious lung sounds.  No distress.  No hypoxia.  Pulse ox 96%  ra. Which is normal    Abdominal:      General: Abdomen is flat.      Palpations: Abdomen is soft.      Tenderness: There is no abdominal tenderness. There is no right CVA tenderness or left CVA tenderness.   Musculoskeletal:         General: No signs of injury. Normal range of motion.      Cervical back: Normal range of motion and neck supple.   Lymphadenopathy:      Cervical: No cervical adenopathy.   Skin:     General: Skin is warm and dry.      Capillary Refill: Capillary refill takes less than 2 seconds.   Neurological:      General: No focal deficit present.      Mental Status: He is alert and oriented to person, place, and time.      GCS: GCS eye subscore is 4. GCS verbal subscore is 5. GCS motor subscore is 6.   Psychiatric:         Mood and Affect: Mood normal.         Behavior: Behavior normal.         Thought Content: Thought content normal.         Judgment: Judgment normal.           ED Course     Labs Reviewed   POCT CBC - Abnormal; Notable for the following components:       Result Value    WBC IC 12.1 (*)     RBC IC 5.78 (*)     # Neutrophil 8.6 (*)     # Mixed Cells 1.1 (*)     All other components within normal limits   POCT ISTAT CHEM8 CARTRIDGE - Abnormal; Notable for the following components:    ISTAT Hematocrit 57 (*)     All other components within normal limits   POCT MONO TEST - Normal   C. DIFFICILE(TOXIGENIC)PCR     Recent Results (from the past 24 hour(s))   POCT Mono    Collection Time: 02/28/24 12:37 PM   Result Value Ref Range    POCT Mono Negative Negative   POCT CBC    Collection Time: 02/28/24 12:37 PM   Result Value Ref Range    WBC IC 12.1 (H) 4.0 - 11.0 x10ˆ3/uL    RBC IC 5.78 (H) 4.30 - 5.70 X10ˆ6/uL    HGB IC 17.3 13.0 - 17.5 g/dL    HCT IC 50.1 39.0 - 53.0 %    MCV IC 86.7 80.0 - 100.0 fL    MCH IC 29.9 26.0 - 34.0 pg    MCHC IC 34.5 31.0 - 37.0 g/dL    PLT .0 150.0 - 450.0 X10ˆ3/uL    # Neutrophil 8.6 (H) 1.5 - 7.7 X10ˆ3/uL    # Lymphocyte 2.4 1.0 - 4.0 X10ˆ3/uL    #  Mixed Cells 1.1 (H) 0.1 - 1.0 X10ˆ3/uL    Neutrophil % 71.3 %    Lymphocyte % 19.6 %    Mixed Cell % 9.1 %   POCT ISTAT chem8 cartridge    Collection Time: 02/28/24 12:47 PM   Result Value Ref Range    ISTAT Sodium 139 136 - 145 mmol/L    ISTAT BUN 11 7 - 18 mg/dL    ISTAT Potassium 3.7 3.6 - 5.1 mmol/L    ISTAT Chloride 101 98 - 112 mmol/L    ISTAT Ionized Calcium 1.14 1.12 - 1.32 mmol/L    ISTAT Hematocrit 57 (H) 37 - 53 %    ISTAT Glucose 95 70 - 99 mg/dL    ISTAT TCO2 25 21 - 32 mmol/L    ISTAT Creatinine 0.80 0.70 - 1.30 mg/dL    eGFR-Cr 124 >=60 mL/min/1.73m2     XR CHEST PA + LAT CHEST (CPT=71046)    Result Date: 2/28/2024  CONCLUSION: Normal examination.     Dictated by (CST): Augustine Moore MD on 2/28/2024 at 12:07 PM     Finalized by (CST): Augustine Moore MD on 2/28/2024 at 12:07 PM             [facility]          Medical Decision Making  Viral syndrome  Differential diagnosis: COVID, flu, strep, mono, otitis media, pneumonia, other viral syndrome  Symptoms ongoing for over a month.  Discussed the option of COVID and flu testing but deferred due to length of symptoms  Strep negative 2 days ago.  Deferred repeat testing  Complaining of some ear pain but bilateral TMs are clear without otitis media or effusion  Having some diarrhea and stomach upset but abdomen is soft and nontender.  No evidence of acute abdomen.  No suspicion for appendicitis.  Persistent cough for over a month.  Chest x-ray obtained.  Negative for pneumonia.  Labs obtained due to length of symptoms.  Monotest is negative.  CBC with WBC of 12.1.  Hematocrit 57.  No significant dehydration.  No ANDIE.  Has only had 2 days of diarrhea but diarrhea began after taking 2 rounds of antibiotics (amoxicillin and Augmentin).  C. difficile sample collected and sent to lab.  Patient is nontoxic-appearing on exam.  Well-hydrated.  No acute abdomen.  No evidence of bacterial infection.  Unclear cause of the patient's symptoms.  Appear to be extended viral  symptoms.  May try Medrol Dosepak for persistent ear pain and pressure.  Recommend Flonase.  May continue Mucinex.  Imodium for diarrhea.  Stay hydrated.  Stop using marijuana.  Needs to establish care with a primary doctor.  Multiple referrals provided.  The case was discussed with attending Dr Weir. They are in agreement with the plan of care.   Results and plan of care discussed with the patient/family. They are in agreement with discharge. They understand to follow up with their primary doctor or the referral physician for further evaluation, especially if no improvement.  Also discussed the limitations of immediate care, patient is aware that if symptoms are worse they should go to the emergency room. Verbal and written discharge instructions were given.         Problems Addressed:  Antibiotic-associated diarrhea: acute illness or injury  Subacute cough: acute illness or injury    Amount and/or Complexity of Data Reviewed  External Data Reviewed: labs and notes.     Details: Seen 2/3, 2/16, 2/26  Had negative strep.  Given amoxicillin and Augmentin  Labs: ordered. Decision-making details documented in ED Course.  Radiology: ordered and independent interpretation performed. Decision-making details documented in ED Course.     Details: No pneumonia    Risk  OTC drugs.  Prescription drug management.        Disposition and Plan     Clinical Impression:  1. Subacute cough    2. Antibiotic-associated diarrhea         Disposition:  Discharge  2/28/2024  1:11 pm    Follow-up:  Hunter Reddy, Kennedy Almanza, DO  [address] [facility] [address]  [place] [address]  [phone]          Preet Narvaez MD  [address] [place] [address] [address]  [facility] [id number] [phone]          Rozina Dexter MD  [address] [facility] [address]  [facility] [address]  [phone]                Medications Prescribed:  Discharge Medication List as of 2/28/2024  1:11 PM        START taking these medications    Details   methylPREDNISolone  (MEDROL) 4 MG Oral Tablet Therapy Pack Dosepack: take as directed, Normal, Disp-1 each, R-0

## 2024-02-29 LAB — C DIFF TOX B STL QL: NEGATIVE

## 2024-12-28 ENCOUNTER — OFFICE VISIT (OUTPATIENT)
Dept: FAMILY MEDICINE CLINIC | Facility: CLINIC | Age: 29
End: 2024-12-28

## 2024-12-28 VITALS
BODY MASS INDEX: 33.62 KG/M2 | WEIGHT: 262 LBS | DIASTOLIC BLOOD PRESSURE: 83 MMHG | HEIGHT: 74 IN | HEART RATE: 93 BPM | SYSTOLIC BLOOD PRESSURE: 125 MMHG | OXYGEN SATURATION: 95 %

## 2024-12-28 DIAGNOSIS — R06.2 WHEEZING: ICD-10-CM

## 2024-12-28 DIAGNOSIS — J20.9 ACUTE BRONCHITIS, UNSPECIFIED ORGANISM: Primary | ICD-10-CM

## 2024-12-28 PROCEDURE — 3079F DIAST BP 80-89 MM HG: CPT | Performed by: FAMILY MEDICINE

## 2024-12-28 PROCEDURE — 3074F SYST BP LT 130 MM HG: CPT | Performed by: FAMILY MEDICINE

## 2024-12-28 PROCEDURE — 99202 OFFICE O/P NEW SF 15 MIN: CPT | Performed by: FAMILY MEDICINE

## 2024-12-28 PROCEDURE — 3008F BODY MASS INDEX DOCD: CPT | Performed by: FAMILY MEDICINE

## 2024-12-28 RX ORDER — ALBUTEROL SULFATE 90 UG/1
2 INHALANT RESPIRATORY (INHALATION) EVERY 4 HOURS PRN
Qty: 1 EACH | Refills: 0 | Status: SHIPPED | OUTPATIENT
Start: 2024-12-28

## 2024-12-28 NOTE — PROGRESS NOTES
Subjective:   Manny Vernon is a 29 year old male who presents for Other (Patient concerned with black mold, Monday patient woke  up with sore throat, niece was sick , cough and fatigue , patient denies fever )       History/Other:    Chief Complaint Reviewed and Verified  Nursing Notes Reviewed and   Verified  Tobacco Reviewed  Allergies Reviewed  Medications Reviewed    Problem List Reviewed  Medical History Reviewed  Surgical History   Reviewed  Family History Reviewed  Social History Reviewed         Tobacco:  He has never smoked tobacco.    Current Outpatient Medications   Medication Sig Dispense Refill    albuterol (PROAIR HFA) 108 (90 Base) MCG/ACT Inhalation Aero Soln Inhale 2 puffs into the lungs every 4 (four) hours as needed for Wheezing. 1 each 0    Fluocinonide 0.05 % External Solution Apply 1 Application topically daily. (Patient not taking: Reported on 12/28/2024) 120 mL 5         Review of Systems:  Review of Systems   Constitutional: Negative.    HENT:  Positive for congestion.    Respiratory:  Positive for cough. Negative for shortness of breath.          Objective:   /83   Pulse 93   Ht 6' 2\" (1.88 m)   Wt 262 lb (118.8 kg)   SpO2 95%   BMI 33.64 kg/m²  Estimated body mass index is 33.64 kg/m² as calculated from the following:    Height as of this encounter: 6' 2\" (1.88 m).    Weight as of this encounter: 262 lb (118.8 kg).  Physical Exam  Vitals reviewed.   HENT:      Right Ear: Tympanic membrane normal.      Left Ear: Tympanic membrane normal.      Mouth/Throat:      Mouth: Mucous membranes are moist.   Pulmonary:      Breath sounds: Wheezing present.           Assessment & Plan:   1. Acute bronchitis, unspecified organism (Primary)  2. Wheezing  -     Albuterol Sulfate HFA; Inhale 2 puffs into the lungs every 4 (four) hours as needed for Wheezing.  Dispense: 1 each; Refill: 0  Recommend inhaler for the next week.  Mild wheeze.  If worsens call.        No follow-ups  on file.    Tyler Lewis DO, 12/28/2024, 10:22 AM

## (undated) NOTE — LETTER
Date & Time: 2/28/2024, 1:21 PM  Patient: Manny Vernon  Encounter Provider(s):    Mela Miles APRN       To Whom It May Concern:    Manny Vernon was seen and treated in our department on 2/28/2024. He can return to work 3/1/24.    If you have any questions or concerns, please do not hesitate to call.        _____________________________  Physician/APC Signature